# Patient Record
Sex: MALE | Race: WHITE | NOT HISPANIC OR LATINO | URBAN - METROPOLITAN AREA
[De-identification: names, ages, dates, MRNs, and addresses within clinical notes are randomized per-mention and may not be internally consistent; named-entity substitution may affect disease eponyms.]

---

## 2017-01-21 ENCOUNTER — OFFICE VISIT - RIVER FALLS (OUTPATIENT)
Dept: FAMILY MEDICINE | Facility: CLINIC | Age: 52
End: 2017-01-21

## 2017-01-21 ASSESSMENT — MIFFLIN-ST. JEOR: SCORE: 1499.19

## 2017-01-28 ENCOUNTER — OFFICE VISIT - RIVER FALLS (OUTPATIENT)
Dept: FAMILY MEDICINE | Facility: CLINIC | Age: 52
End: 2017-01-28

## 2017-01-28 ASSESSMENT — MIFFLIN-ST. JEOR: SCORE: 1497.37

## 2017-06-11 ENCOUNTER — OFFICE VISIT - RIVER FALLS (OUTPATIENT)
Dept: FAMILY MEDICINE | Facility: CLINIC | Age: 52
End: 2017-06-11

## 2017-06-11 ASSESSMENT — MIFFLIN-ST. JEOR: SCORE: 1485.58

## 2017-09-06 ENCOUNTER — HOSPITAL ENCOUNTER (OUTPATIENT)
Facility: CLINIC | Age: 52
Discharge: HOME | End: 2017-09-06
Attending: OPHTHALMOLOGY | Admitting: OPHTHALMOLOGY
Payer: COMMERCIAL

## 2017-09-06 ENCOUNTER — ANESTHESIA (OUTPATIENT)
Dept: SURGERY | Facility: CLINIC | Age: 52
End: 2017-09-06
Payer: COMMERCIAL

## 2017-09-06 ENCOUNTER — ANESTHESIA EVENT (OUTPATIENT)
Dept: SURGERY | Facility: CLINIC | Age: 52
End: 2017-09-06
Payer: COMMERCIAL

## 2017-09-06 VITALS
HEIGHT: 67 IN | TEMPERATURE: 98 F | OXYGEN SATURATION: 94 % | WEIGHT: 140 LBS | SYSTOLIC BLOOD PRESSURE: 127 MMHG | RESPIRATION RATE: 14 BRPM | DIASTOLIC BLOOD PRESSURE: 76 MMHG | HEART RATE: 56 BPM | BODY MASS INDEX: 21.97 KG/M2

## 2017-09-06 PROCEDURE — S0020 INJECTION, BUPIVICAINE HYDRO: HCPCS | Performed by: OPHTHALMOLOGY

## 2017-09-06 PROCEDURE — 25000125 ZZHC RX 250: Performed by: OPHTHALMOLOGY

## 2017-09-06 PROCEDURE — 71000028 ZZH EYE RECOVERY PHASE 2 EACH 15 MINS: Performed by: OPHTHALMOLOGY

## 2017-09-06 PROCEDURE — 27211045 ZZH EYE GAS ISPAN C3F8: Performed by: OPHTHALMOLOGY

## 2017-09-06 PROCEDURE — 36000104 ZZH EYE SURGERY LEVEL 4 1ST 30 MIN: Performed by: OPHTHALMOLOGY

## 2017-09-06 PROCEDURE — 37000009 ZZH ANESTHESIA TECHNICAL FEE, EACH ADDTL 15 MIN: Performed by: OPHTHALMOLOGY

## 2017-09-06 PROCEDURE — 36000105 ZZH EYE SURGERY LEVEL 4 EA 15 ADDTL MIN: Performed by: OPHTHALMOLOGY

## 2017-09-06 PROCEDURE — 27210794 ZZH OR GENERAL SUPPLY STERILE: Performed by: OPHTHALMOLOGY

## 2017-09-06 PROCEDURE — 25000125 ZZHC RX 250: Performed by: NURSE ANESTHETIST, CERTIFIED REGISTERED

## 2017-09-06 PROCEDURE — 40000170 ZZH STATISTIC PRE-PROCEDURE ASSESSMENT II: Performed by: OPHTHALMOLOGY

## 2017-09-06 PROCEDURE — 25000128 H RX IP 250 OP 636: Performed by: ANESTHESIOLOGY

## 2017-09-06 PROCEDURE — 27210995 ZZH RX 272: Performed by: OPHTHALMOLOGY

## 2017-09-06 PROCEDURE — 25000128 H RX IP 250 OP 636: Performed by: OPHTHALMOLOGY

## 2017-09-06 PROCEDURE — 25000128 H RX IP 250 OP 636: Performed by: NURSE ANESTHETIST, CERTIFIED REGISTERED

## 2017-09-06 PROCEDURE — 25000125 ZZHC RX 250: Performed by: ANESTHESIOLOGY

## 2017-09-06 PROCEDURE — C1784 OCULAR DEV, INTRAOP, DET RET: HCPCS | Performed by: OPHTHALMOLOGY

## 2017-09-06 PROCEDURE — 37000008 ZZH ANESTHESIA TECHNICAL FEE, 1ST 30 MIN: Performed by: OPHTHALMOLOGY

## 2017-09-06 DEVICE — EYE IMP STRIP 4MM STYLE 41 S2970: Type: IMPLANTABLE DEVICE | Site: EYE | Status: FUNCTIONAL

## 2017-09-06 DEVICE — EYE IMP SLEEVE STYLE 70 S3018: Type: IMPLANTABLE DEVICE | Site: EYE | Status: FUNCTIONAL

## 2017-09-06 RX ORDER — DEXAMETHASONE SODIUM PHOSPHATE 10 MG/ML
INJECTION, SOLUTION INTRAMUSCULAR; INTRAVENOUS PRN
Status: DISCONTINUED | OUTPATIENT
Start: 2017-09-06 | End: 2017-09-06 | Stop reason: HOSPADM

## 2017-09-06 RX ORDER — HYDRALAZINE HYDROCHLORIDE 20 MG/ML
2.5-5 INJECTION INTRAMUSCULAR; INTRAVENOUS EVERY 10 MIN PRN
Status: DISCONTINUED | OUTPATIENT
Start: 2017-09-06 | End: 2017-09-06 | Stop reason: HOSPADM

## 2017-09-06 RX ORDER — ATROPINE SULFATE 10 MG/ML
SOLUTION/ DROPS OPHTHALMIC
Status: DISCONTINUED | OUTPATIENT
Start: 1840-12-31 | End: 2017-09-06 | Stop reason: HOSPADM

## 2017-09-06 RX ORDER — CYCLOPENTOLATE HYDROCHLORIDE 10 MG/ML
1 SOLUTION/ DROPS OPHTHALMIC
Status: COMPLETED | OUTPATIENT
Start: 2017-09-06 | End: 2017-09-06

## 2017-09-06 RX ORDER — ONDANSETRON 4 MG/1
4 TABLET, ORALLY DISINTEGRATING ORAL EVERY 30 MIN PRN
Status: DISCONTINUED | OUTPATIENT
Start: 2017-09-06 | End: 2017-09-06 | Stop reason: HOSPADM

## 2017-09-06 RX ORDER — SODIUM CHLORIDE, SODIUM LACTATE, POTASSIUM CHLORIDE, CALCIUM CHLORIDE 600; 310; 30; 20 MG/100ML; MG/100ML; MG/100ML; MG/100ML
INJECTION, SOLUTION INTRAVENOUS CONTINUOUS
Status: DISCONTINUED | OUTPATIENT
Start: 2017-09-06 | End: 2017-09-06 | Stop reason: HOSPADM

## 2017-09-06 RX ORDER — NALOXONE HYDROCHLORIDE 0.4 MG/ML
.1-.4 INJECTION, SOLUTION INTRAMUSCULAR; INTRAVENOUS; SUBCUTANEOUS
Status: DISCONTINUED | OUTPATIENT
Start: 2017-09-06 | End: 2017-09-06 | Stop reason: HOSPADM

## 2017-09-06 RX ORDER — ALBUTEROL SULFATE 0.83 MG/ML
2.5 SOLUTION RESPIRATORY (INHALATION) EVERY 4 HOURS PRN
Status: DISCONTINUED | OUTPATIENT
Start: 2017-09-06 | End: 2017-09-06 | Stop reason: HOSPADM

## 2017-09-06 RX ORDER — FENTANYL CITRATE 50 UG/ML
25-50 INJECTION, SOLUTION INTRAMUSCULAR; INTRAVENOUS
Status: DISCONTINUED | OUTPATIENT
Start: 2017-09-06 | End: 2017-09-06 | Stop reason: HOSPADM

## 2017-09-06 RX ORDER — ONDANSETRON 2 MG/ML
INJECTION INTRAMUSCULAR; INTRAVENOUS PRN
Status: DISCONTINUED | OUTPATIENT
Start: 2017-09-06 | End: 2017-09-06

## 2017-09-06 RX ORDER — ATROPINE SULFATE 10 MG/ML
SOLUTION/ DROPS OPHTHALMIC PRN
Status: DISCONTINUED | OUTPATIENT
Start: 2017-09-06 | End: 2017-09-06 | Stop reason: HOSPADM

## 2017-09-06 RX ORDER — NEOMYCIN SULFATE, POLYMYXIN B SULFATE, AND DEXAMETHASONE 3.5; 10000; 1 MG/G; [USP'U]/G; MG/G
OINTMENT OPHTHALMIC PRN
Status: DISCONTINUED | OUTPATIENT
Start: 2017-09-06 | End: 2017-09-06 | Stop reason: HOSPADM

## 2017-09-06 RX ORDER — BALANCED SALT SOLUTION 6.4; .75; .48; .3; 3.9; 1.7 MG/ML; MG/ML; MG/ML; MG/ML; MG/ML; MG/ML
SOLUTION OPHTHALMIC PRN
Status: DISCONTINUED | OUTPATIENT
Start: 2017-09-06 | End: 2017-09-06 | Stop reason: HOSPADM

## 2017-09-06 RX ORDER — PHENYLEPHRINE HYDROCHLORIDE 25 MG/ML
1 SOLUTION/ DROPS OPHTHALMIC
Status: COMPLETED | OUTPATIENT
Start: 2017-09-06 | End: 2017-09-06

## 2017-09-06 RX ORDER — PROPOFOL 10 MG/ML
INJECTION, EMULSION INTRAVENOUS PRN
Status: DISCONTINUED | OUTPATIENT
Start: 2017-09-06 | End: 2017-09-06

## 2017-09-06 RX ORDER — ONDANSETRON 2 MG/ML
4 INJECTION INTRAMUSCULAR; INTRAVENOUS EVERY 30 MIN PRN
Status: DISCONTINUED | OUTPATIENT
Start: 2017-09-06 | End: 2017-09-06 | Stop reason: HOSPADM

## 2017-09-06 RX ORDER — LIDOCAINE HYDROCHLORIDE 20 MG/ML
INJECTION, SOLUTION INFILTRATION; PERINEURAL PRN
Status: DISCONTINUED | OUTPATIENT
Start: 2017-09-06 | End: 2017-09-06

## 2017-09-06 RX ORDER — MEPERIDINE HYDROCHLORIDE 25 MG/ML
12.5 INJECTION INTRAMUSCULAR; INTRAVENOUS; SUBCUTANEOUS
Status: DISCONTINUED | OUTPATIENT
Start: 2017-09-06 | End: 2017-09-06 | Stop reason: HOSPADM

## 2017-09-06 RX ORDER — FENTANYL CITRATE 50 UG/ML
INJECTION, SOLUTION INTRAMUSCULAR; INTRAVENOUS PRN
Status: DISCONTINUED | OUTPATIENT
Start: 2017-09-06 | End: 2017-09-06

## 2017-09-06 RX ADMIN — CYCLOPENTOLATE HYDROCHLORIDE 1 DROP: 10 SOLUTION/ DROPS OPHTHALMIC at 08:24

## 2017-09-06 RX ADMIN — PHENYLEPHRINE HYDROCHLORIDE 1 DROP: 2.5 SOLUTION/ DROPS OPHTHALMIC at 08:24

## 2017-09-06 RX ADMIN — FENTANYL CITRATE 50 MCG: 50 INJECTION, SOLUTION INTRAMUSCULAR; INTRAVENOUS at 09:15

## 2017-09-06 RX ADMIN — MIDAZOLAM HYDROCHLORIDE 2 MG: 1 INJECTION, SOLUTION INTRAMUSCULAR; INTRAVENOUS at 09:15

## 2017-09-06 RX ADMIN — ONDANSETRON 4 MG: 2 INJECTION INTRAMUSCULAR; INTRAVENOUS at 09:30

## 2017-09-06 RX ADMIN — SODIUM CHLORIDE, POTASSIUM CHLORIDE, SODIUM LACTATE AND CALCIUM CHLORIDE: 600; 310; 30; 20 INJECTION, SOLUTION INTRAVENOUS at 08:25

## 2017-09-06 RX ADMIN — PHENYLEPHRINE HYDROCHLORIDE 1 DROP: 2.5 SOLUTION/ DROPS OPHTHALMIC at 08:18

## 2017-09-06 RX ADMIN — PROPOFOL 40 MG: 10 INJECTION, EMULSION INTRAVENOUS at 09:16

## 2017-09-06 RX ADMIN — PHENYLEPHRINE HYDROCHLORIDE 1 DROP: 2.5 SOLUTION/ DROPS OPHTHALMIC at 08:13

## 2017-09-06 RX ADMIN — PROPOFOL 40 MG: 10 INJECTION, EMULSION INTRAVENOUS at 09:15

## 2017-09-06 RX ADMIN — CYCLOPENTOLATE HYDROCHLORIDE 1 DROP: 10 SOLUTION/ DROPS OPHTHALMIC at 08:13

## 2017-09-06 RX ADMIN — LIDOCAINE HYDROCHLORIDE 40 MG: 20 INJECTION, SOLUTION INFILTRATION; PERINEURAL at 09:15

## 2017-09-06 RX ADMIN — LIDOCAINE HYDROCHLORIDE 1 ML: 10 INJECTION, SOLUTION EPIDURAL; INFILTRATION; INTRACAUDAL; PERINEURAL at 08:25

## 2017-09-06 RX ADMIN — CYCLOPENTOLATE HYDROCHLORIDE 1 DROP: 10 SOLUTION/ DROPS OPHTHALMIC at 08:18

## 2017-09-06 NOTE — DISCHARGE INSTRUCTIONS
[facility] Discharge  Instructions  Anesthesia (Eye Care Center)   Adult Discharge Instructions    For 24 hours after surgery    1. Get plenty of rest.  Make arrangements to have a responsible adult stay with you for at least 6 hours after you leave the hospital.  2. Do not drive or use heavy equipment for 24 hours.    3. Do not drink alcohol for 24 hours.  4. Do not sign legal documents or make important decisions for 24 hours.  5. Avoid strenuous or risky activities. You may feel lightheaded.  If so, sit for a few minutes before standing.  Have someone help you get up.   6. Conscious sedation patients may resume a regular diet..  7. Any questions of medical nature, call your physician.     INSTRUCTIONS FOLLOWING SURGERY  DR. PERRY, DR. KENDALL, DR. MCFADDEN, DR. PIRES, DR. SERRA    Will I have pain?  Some discomfort is normal and expected following surgery.  The first few days after surgery you may need to use prescription pain pills.  Taking Advil (Ibuprofen) regularly may help prevent pain.    Discomfort should gradually decrease and Tylenol or Advil should be sufficient to relieve pain. A foreign body sensation in the cornea of the eye is very common and caused by sutures placed at surgery. These sutures will go away in one to two weeks. If the pain worsens, you should call the doctor.     Do I need to wear an eye patch?  You do not need to wear an eye patch at home after the doctor has removed the patch on your first day after surgery.  However, you may be more comfortable wearing a patch outside in the sun, when sleeping or napping, or in a angelina, windy environment.     How much drainage should I have?  You may expect a moderate amount of drainage for a week.  Gradually the drainage should decrease.  The lids can be cleaned with a clean washcloth and gentle soap or diluted baby shampoo.  Wipe the eyelids gently from the nose outward. Some blood in the tears is a normal  finding.    Will there be swelling?  Some swelling is normal for about a week or two after which it will gradually decrease.  Applying a cool compress, using a clean washcloth, for 5 - 10 minutes several times a day may reduce the swelling and make you more comfortable.  People may have some swelling of both eyes, especially if face down positioning is required. The white part of the eye may appear very red or bloody for a week or two. This may get worse a few days after surgery. Though the bright red appearance can look frightening, it is a normal finding early after surgery and will resolve in a few weeks.    Will I need to use eye drops?  You will be using several different kinds of eye drops or ointment (salve) when you leave the hospital.  The directions will be on each bottle. The medication with the red top will keep your eye dilated and may make your eye more sensitive to light. Wearing sunglasses may help. The other medication is a combination antibiotics/steroid to prevent infection and promote healing.  Occasionally a third drop is used to control the pressure in your eye. A new bottle of artificial tears or lubricant ointment may be used along with your prescription eye drops after surgery. You will be using drops from four to eight weeks. Bring all eye medications (drops, ointments, or pills) with you to each visit.    Always wash your hands before putting in the eye drops.  You may wish to have someone else help you.  Pull down on the lower lid and squeeze one drop from the bottle being careful not to touch the dropper to your eye or eyelid.  One drop is sufficient, but another may be used if the first did not go into the eye.  It is often easier to put in the drops if you are reclining or lying down.  Wait five (5) minutes after the first drop before using the second drop to allow the medications to absorb into the eye.        How long will it take for my vision to improve?  Your vision should  gradually improve, but it may take up to six months to regain your best vision.  Frequently, air or gas bubbles are injected into the eye at the time of surgery.  This will blur your vision significantly at first.  As the bubble becomes smaller it will cause a black line in your vision that moves as you move your head.  As the bubble becomes smaller you may notice that it looks more like a bubble or that it will break up into several smaller bubbles. It will take from a few days to a few weeks for the bubble to dissolve and be replaced by clear fluid.     You may notice floaters or double vision after your surgery.  These symptoms usually will decrease with time.  If the double vision is bothersome patching the eye may help.    If you notice a sudden worsening in your vision call your doctor.    Are there any physical restrictions after surgery?  If an air or gas bubble was placed in the eye during surgery you will be asked to spend most of your time (both awake and during the night) with your head in a specific position, frequently face down.  You can sleep face down or on your left side (down) until tomorrow morning. As the eye heals and the bubble dissolves there will be less of a need for you to stay in that specific position.  You should avoid sleeping on your back until the bubble has totally dissolved and you have been given permission from your surgeon. You should not fly in an airplane or go in high altitudes in the mountains while there is a bubble in your eye. If you should require any other surgery, under general anesthesia, while you still have an air bubble, have your surgeon or anesthetist contact us prior to your surgery. Some anesthetic agents can make the bubble expand and seriously damage your eye.  You will have a green medical alert band placed on your wrist for this reason; this should not be removed until the doctor gives you permission or removes it for you.    Heavy lifting (greater than 50  pounds), swimming and contact sports should be avoided for about 3 to 4 weeks after surgery.    You may resume your usual sexual activities about one week after surgery.    When may I return to work or my normal activities?  Depending on the type of work, you may return to work within a few days.  If your work involves physical activity or driving, you will need to restrict your activities and remain home longer.    You may watch TV, look at magazines, or work puzzles.  Reading may be uncomfortable for several days, but using the eyes will not cause any damage.    You may go outside as usual.  If conditions are windy or angelina wear an eye pad to avoid getting dust or dirt in the eye.    Can I travel?  You cannot fly in an airplane or drive into the mountains as long as the air or gas bubble remains in your eye.    Are there any driving restrictions?  Someone will need to drive you home from the hospital.  Generally driving can be resumed in several days if you have good vision in your other eye.  If you do not feel comfortable driving, do not drive!  Your depth perception will be decreased so you will want to try driving during the day in light traffic until you feel comfortable driving.  You should restrict your driving while you are taking prescription pain pills as they also can affect your judgment.    When can I shower and wash my hair?  You may shower or bathe when you get home, but avoid getting water in your eye.  You may want someone to help you shampoo your hair at first.      You may shave, brush your teeth, or comb your hair.  Do not use make-up, mascara, or creams/lotions around your eyes for several weeks     When will I see the doctor again?  Generally you will be seen the first day after surgery and again 1-2 weeks later.  If you have not received a return appointment before leaving the hospital, you should call our office during the business hours to arrange an appointment. If you will be seeing your  local doctor instead of us, you will need to call that office to set up an appointment.    How do I reach a doctor if I have concerns?  One of our doctors is available by calling [phone] in the [place] area, [phone] in [place], or [phone] from outside the area.  After normal office hours the answering service will put you in touch with the doctor on call. The doctors take call from home but are available for a retinal emergency. Please try to call for routine questions and prescription refills during business hours.    You should call your doctor if:      You notice a sudden decrease in your vision.      Have severe pain or pain increases rather than subsiding.      You notice a new black curtain over your eye that is not the gas bubble.    If you have any of these symptoms, you may need to be examined.

## 2017-09-06 NOTE — IP AVS SNAPSHOT
MRN:[id number]                      After Visit Summary   9/6/2017    Jevon Lebron    MRN: [id number]           Thank you!     Thank you for choosing [facility] for your care. Our goal is always to provide you with excellent care. Hearing back from our patients is one way we can continue to improve our services. Please take a few minutes to complete the written survey that you may receive in the mail after you visit with us. Thank you!        Patient Information     Date Of Birth          1965        About your hospital stay     You were admitted on:  September 6, 2017 You last received care in the:  [facility]    You were discharged on:  September 6, 2017       Who to Call     For medical emergencies, please call 911.  For non-urgent questions about your medical care, please call your primary care provider or clinic, None  For questions related to your surgery, please call your surgery clinic        Attending Provider     Provider Monster Arenas MD Surgery       Primary Care Provider    None Specified      After Care Instructions     Activity       Avoid strenuous activities the next several days.            Position       Face down at all times.                  Further instructions from your care team       [facility] Discharge  Instructions  Anesthesia (Eye Care Center)   Adult Discharge Instructions    For 24 hours after surgery    1. Get plenty of rest.  Make arrangements to have a responsible adult stay with you for at least 6 hours after you leave the hospital.  2. Do not drive or use heavy equipment for 24 hours.    3. Do not drink alcohol for 24 hours.  4. Do not sign legal documents or make important decisions for 24 hours.  5. Avoid strenuous or risky activities. You may feel lightheaded.  If so, sit for a few minutes before standing.  Have someone help you get up.   6. Conscious sedation patients may  resume a regular diet..  7. Any questions of medical nature, call your physician.     INSTRUCTIONS FOLLOWING SURGERY  DR. PERRY, DR. KENDALL, DR. MCFADDEN, DR. PIRES, DR. SERRA    Will I have pain?  Some discomfort is normal and expected following surgery.  The first few days after surgery you may need to use prescription pain pills.  Taking Advil (Ibuprofen) regularly may help prevent pain.    Discomfort should gradually decrease and Tylenol or Advil should be sufficient to relieve pain. A foreign body sensation in the cornea of the eye is very common and caused by sutures placed at surgery. These sutures will go away in one to two weeks. If the pain worsens, you should call the doctor.     Do I need to wear an eye patch?  You do not need to wear an eye patch at home after the doctor has removed the patch on your first day after surgery.  However, you may be more comfortable wearing a patch outside in the sun, when sleeping or napping, or in a angelina, windy environment.     How much drainage should I have?  You may expect a moderate amount of drainage for a week.  Gradually the drainage should decrease.  The lids can be cleaned with a clean washcloth and gentle soap or diluted baby shampoo.  Wipe the eyelids gently from the nose outward. Some blood in the tears is a normal finding.    Will there be swelling?  Some swelling is normal for about a week or two after which it will gradually decrease.  Applying a cool compress, using a clean washcloth, for 5 - 10 minutes several times a day may reduce the swelling and make you more comfortable.  People may have some swelling of both eyes, especially if face down positioning is required. The white part of the eye may appear very red or bloody for a week or two. This may get worse a few days after surgery. Though the bright red appearance can look frightening, it is a normal finding early after surgery and will resolve in a few weeks.    Will I need to use eye drops?  You  will be using several different kinds of eye drops or ointment (salve) when you leave the hospital.  The directions will be on each bottle. The medication with the red top will keep your eye dilated and may make your eye more sensitive to light. Wearing sunglasses may help. The other medication is a combination antibiotics/steroid to prevent infection and promote healing.  Occasionally a third drop is used to control the pressure in your eye. A new bottle of artificial tears or lubricant ointment may be used along with your prescription eye drops after surgery. You will be using drops from four to eight weeks. Bring all eye medications (drops, ointments, or pills) with you to each visit.    Always wash your hands before putting in the eye drops.  You may wish to have someone else help you.  Pull down on the lower lid and squeeze one drop from the bottle being careful not to touch the dropper to your eye or eyelid.  One drop is sufficient, but another may be used if the first did not go into the eye.  It is often easier to put in the drops if you are reclining or lying down.  Wait five (5) minutes after the first drop before using the second drop to allow the medications to absorb into the eye.        How long will it take for my vision to improve?  Your vision should gradually improve, but it may take up to six months to regain your best vision.  Frequently, air or gas bubbles are injected into the eye at the time of surgery.  This will blur your vision significantly at first.  As the bubble becomes smaller it will cause a black line in your vision that moves as you move your head.  As the bubble becomes smaller you may notice that it looks more like a bubble or that it will break up into several smaller bubbles. It will take from a few days to a few weeks for the bubble to dissolve and be replaced by clear fluid.     You may notice floaters or double vision after your surgery.  These symptoms usually will decrease  with time.  If the double vision is bothersome patching the eye may help.    If you notice a sudden worsening in your vision call your doctor.    Are there any physical restrictions after surgery?  If an air or gas bubble was placed in the eye during surgery you will be asked to spend most of your time (both awake and during the night) with your head in a specific position, frequently face down.  You can sleep face down or on your left side (down) until tomorrow morning. As the eye heals and the bubble dissolves there will be less of a need for you to stay in that specific position.  You should avoid sleeping on your back until the bubble has totally dissolved and you have been given permission from your surgeon. You should not fly in an airplane or go in high altitudes in the mountains while there is a bubble in your eye. If you should require any other surgery, under general anesthesia, while you still have an air bubble, have your surgeon or anesthetist contact us prior to your surgery. Some anesthetic agents can make the bubble expand and seriously damage your eye.  You will have a green medical alert band placed on your wrist for this reason; this should not be removed until the doctor gives you permission or removes it for you.    Heavy lifting (greater than 50 pounds), swimming and contact sports should be avoided for about 3 to 4 weeks after surgery.    You may resume your usual sexual activities about one week after surgery.    When may I return to work or my normal activities?  Depending on the type of work, you may return to work within a few days.  If your work involves physical activity or driving, you will need to restrict your activities and remain home longer.    You may watch TV, look at magazines, or work puzzles.  Reading may be uncomfortable for several days, but using the eyes will not cause any damage.    You may go outside as usual.  If conditions are windy or angelina wear an eye pad to avoid  getting dust or dirt in the eye.    Can I travel?  You cannot fly in an airplane or drive into the mountains as long as the air or gas bubble remains in your eye.    Are there any driving restrictions?  Someone will need to drive you home from the hospital.  Generally driving can be resumed in several days if you have good vision in your other eye.  If you do not feel comfortable driving, do not drive!  Your depth perception will be decreased so you will want to try driving during the day in light traffic until you feel comfortable driving.  You should restrict your driving while you are taking prescription pain pills as they also can affect your judgment.    When can I shower and wash my hair?  You may shower or bathe when you get home, but avoid getting water in your eye.  You may want someone to help you shampoo your hair at first.      You may shave, brush your teeth, or comb your hair.  Do not use make-up, mascara, or creams/lotions around your eyes for several weeks     When will I see the doctor again?  Generally you will be seen the first day after surgery and again 1-2 weeks later.  If you have not received a return appointment before leaving the hospital, you should call our office during the business hours to arrange an appointment. If you will be seeing your local doctor instead of us, you will need to call that office to set up an appointment.    How do I reach a doctor if I have concerns?  One of our doctors is available by calling [phone] in the [facility], [phone] in [place], or [phone] from outside the area.  After normal office hours the answering service will put you in touch with the doctor on call. The doctors take call from home but are available for a retinal emergency. Please try to call for routine questions and prescription refills during business hours.    You should call your doctor if:      You notice a sudden decrease in your vision.      Have severe pain or  "pain increases rather than subsiding.      You notice a new black curtain over your eye that is not the gas bubble.    If you have any of these symptoms, you may need to be examined.    Pending Results     No orders found from 2017 to 2017.            Admission Information     Date & Time Provider Department Dept. Phone    2017 Monster Salas MD [facility] [phone]      Your Vitals Were     Blood Pressure Pulse Temperature Respirations Height Weight    127/76 56 98  F (36.7  C) (Temporal) 14 1.702 m (5' 7\") 63.5 kg (140 lb)    Pulse Oximetry BMI (Body Mass Index)                94% 21.93 kg/m2          MyChart Information     [facility] lets you send messages to your doctor, view your test results, renew your prescriptions, schedule appointments and more. To sign up, go to [email or web address] . Click on \"Log in\" on the left side of the screen, which will take you to the Welcome page. Then click on \"Sign up Now\" on the right side of the page.     You will be asked to enter the access code listed below, as well as some personal information. Please follow the directions to create your username and password.     Your access code is: [email or web address]  Expires: 2017 10:30 AM     Your access code will  in 90 days. If you need help or a new code, please call your [facility] clinic or [phone].        Care EveryWhere ID     This is your Care EveryWhere ID. This could be used by other organizations to access your [facility] medical records  [id number]        Equal Access to Services     [facility] Center[facility] AH: Hadjason Moraes, walacho umanzor, qanasrin leon. So [facility] [phone].    ATENCIÓN: Si habla español, tiene a lyles disposición servicios gratuitos de asistencia lingüística. Llame al [phone].    We comply with applicable federal civil rights laws and [place] laws. We do not discriminate on the basis of " race, color, national origin, age, disability sex, sexual orientation or gender identity.               Review of your medicines      CONTINUE these medicines which have NOT CHANGED        Dose / Directions    ATIVAN PO        Refills:  0                Protect others around you: Learn how to safely use, store and throw away your medicines at www.disposemymeds.org.             Medication List: This is a list of all your medications and when to take them. Check marks below indicate your daily home schedule. Keep this list as a reference.      Medications           Morning Afternoon Evening Bedtime As Needed    ATIVAN PO

## 2017-09-06 NOTE — OP NOTE
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      PROCEDURE:  Retinal detachment repair with scleral buckle, 23-gauge pars plana vitrectomy, air-fluid exchange, endolaser, 10% C3F8 gas exchange, right eye.      SURGEON:  Monster Salas MD      ASSISTANT:  Nitin Otoole MD      ANESTHESIA:  Local with monitored anesthesia care.      ESTIMATED BLOOD LOSS:  Minimum.      SPECIMENS:  None.      COMPLICATIONS:  None.      INDICATIONS:  Jevon Lebron presented with decreased vision in the right eye due to a macula-on retinal detachment.  Surgical intervention was offered and the patient decided to proceed after the risks, benefits and alternatives were explained.  Signed and witnessed informed consent was obtained.      DESCRIPTION OF PROCEDURE:  The patient was given a retrobulbar block in the preoperative holding area.  The patient was taken to the operating room and placed in the supine position.  The right eye was prepped and draped in the usual sterile fashion for ophthalmic surgery and a lid speculum was placed.  The conjunctiva was opened for 360 degrees with blunt Rikki scissors.  The 4 quadrants were cleared with Hooker tenotomy scissors.  The 4 rectus muscles were looped with 2-0 silk ties.  64 and 66 blades were used to make belt loops in the superonasal, inferonasal and inferotemporal quadrants.  A 5-0 nylon suture was preplaced in the superotemporal quadrant since the sclera was fairly thin there.  A 41 silicone band was placed around the circumference of the globe.  The band was secured to itself in the inferonasal quadrant with a 70 Watzke sleeve.  The band was pulled up to achieve a moderate buckling effect.  The operating microscope was positioned.  The eye was opened for standard 23 gauge pars plana vitrectomy.  The eye was entered with a  light pipe and vitrectomy probe, and the BIOM was positioned.  The patient had an inferior macula-on retinal detachment with subretinal fluid extending from 4:30 clockwise to 8 o'clock.  There was a large retinal tear at 7 o'clock and 2 smaller retinal holes at 5 o'clock and 5:30.  There was a mild vitreous hemorrhage.  A thorough vitrectomy was performed and the vitreous was trimmed into the periphery using scleral depression.  The retinal tears were marked with diathermy.  An air-fluid exchange was performed and the subretinal fluid was drained through the primary tears.  The retina flattened well.  Endolaser was placed around all peripheral pathology.  The vitreous cavity was filled with 10% C3F8 gas.  All sclerotomies were closed with 8-0 Vicryl.  The eye was left at physiologic pressure and the vitreous cavity had a gas fill of 10% C3F8.  The conjunctiva was closed with 6-0 plain.  Subconjunctival injections of Ancef and Dexamethasone were placed.  Maxitrol and atropine were applied.  A sterile eye pad and shield were taped into position.  The patient was taken to the recovery area in stable condition after tolerating surgery well.  He will position face down and will follow up in 1 day.      The help of a trained vitreoretinal assistant was necessary in the case in order to improve peripheral visualization and to contribute to achieving the surgical objectives.     DELFIN KENDALL MD             D: 2017 10:16   T: 2017 16:03   MT: [id number]#[id number]      Name:     DELICIA VEE   MRN:      [id number]        Account:        [id number]   :      1965           Procedure Date: 2017      Document: [id number]

## 2017-09-06 NOTE — ANESTHESIA CARE TRANSFER NOTE
Patient: Jevon Lebron    Procedure(s):  RIGHT EYE VITRECTOMY PARSPLANA, SCLERAL BUCKLE/RETINAL REATTACHMENT WITH 23 GAUGE SYSTEM, ENDOLASER, AIR/FLUID EXCHANGE, INFUSION OF 10% C3F8 GAS - Wound Class: I-Clean    Diagnosis: RETINAL DETACHMENT  Diagnosis Additional Information: No value filed.    Anesthesia Type:   MAC     Note:  Airway :Room Air  Patient transferred to:Phase II  Comments: Pt awake and able to verbalize needs. Spontaneous respiration without difficulty.  All monitors on and audible. Report given to PACU RN, Vital Signs Stable. Pt denies pain.      Vitals: (Last set prior to Anesthesia Care Transfer)    CRNA VITALS  9/6/2017 0943 - 9/6/2017 1018      9/6/2017             Resp Rate (set): 10                Electronically Signed By: MAKSIM Hartmann CRNA  September 6, 2017  10:18 AM

## 2017-09-06 NOTE — ANESTHESIA POSTPROCEDURE EVALUATION
Patient: eJvon Lebron    Procedure(s):  RIGHT EYE VITRECTOMY PARSPLANA, SCLERAL BUCKLE/RETINAL REATTACHMENT WITH 23 GAUGE SYSTEM, ENDOLASER, AIR/FLUID EXCHANGE, INFUSION OF 10% C3F8 GAS - Wound Class: I-Clean    Diagnosis:RETINAL DETACHMENT  Diagnosis Additional Information: No value filed.    Anesthesia Type:  MAC    Note:  Anesthesia Post Evaluation    Patient location during evaluation: PACU  Patient participation: Able to fully participate in evaluation  Level of consciousness: awake  Pain management: adequate  Airway patency: patent  Cardiovascular status: acceptable  Respiratory status: acceptable  Hydration status: acceptable  PONV: none     Anesthetic complications: None          Last vitals:  Vitals:    09/06/17 0818 09/06/17 1016 09/06/17 1025   BP: 144/81 117/75 127/76   Pulse: 56     Resp: 16 14 14   Temp: 36.7  C (98  F)     SpO2: 98% 94% 94%         Electronically Signed By: Nataliia Owen MD, MD  September 6, 2017  2:41 PM

## 2017-09-06 NOTE — BRIEF OP NOTE
[facility] Brief Operative Note    Pre-operative diagnosis: RETINAL DETACHMENT, right eye   Post-operative diagnosis RETINAL DETACHMENT, right eye   Procedure: Procedure(s):  RIGHT EYE VITRECTOMY PARSPLANA, SCLERAL BUCKLE/RETINAL REATTACHMENT WITH 23 GAUGE SYSTEM, ENDOLASER, AIR/FLUID EXCHANGE, INFUSION OF 10% C3F8 GAS - Wound Class: I-Clean   Surgeon(s): Surgeon(s) and Role:     * Monster Salas MD - Primary     * Nitin Otoole MD - Assisting   Estimated blood loss: * No values recorded between 9/6/2017  9:26 AM and 9/6/2017 10:15 AM *    Specimens: * No specimens in log *   Findings: As above

## 2017-09-06 NOTE — IP AVS SNAPSHOT
[facility]    [address]    [place] [address]    Phone:  [phone]    Fax:  [phone]                                       After Visit Summary   9/6/2017    Jevon Lebron    MRN: [id number]           After Visit Summary Signature Page     I have received my discharge instructions, and my questions have been answered. I have discussed any challenges I see with this plan with the nurse or doctor.    ..........................................................................................................................................  Patient/Patient Representative Signature      ..........................................................................................................................................  Patient Representative Print Name and Relationship to Patient    ..................................................               ................................................  Date                                            Time    ..........................................................................................................................................  Reviewed by Signature/Title    ...................................................              ..............................................  Date                                                            Time

## 2017-09-06 NOTE — ANESTHESIA PREPROCEDURE EVALUATION
Anesthesia Evaluation     . Pt has had prior anesthetic.     No history of anesthetic complications          ROS/MED HX    ENT/Pulmonary:      (-) sleep apnea   Neurologic:       Cardiovascular:         METS/Exercise Tolerance:     Hematologic:         Musculoskeletal:         GI/Hepatic:        (-) GERD   Renal/Genitourinary:         Endo:         Psychiatric:         Infectious Disease:         Malignancy:         Other:                                    Anesthesia Plan      History & Physical Review  History and physical reviewed and following examination; no interval change.    ASA Status:  1 .    NPO Status:  > 8 hours    Plan for MAC Reason for MAC:  Procedure to face, neck, head or breast  PONV prophylaxis:  Ondansetron (or other 5HT-3)       Postoperative Care  Postoperative pain management:  Oral pain medications and IV analgesics.      Consents  Anesthetic plan, risks, benefits and alternatives discussed with:  Patient..                        Procedure: Procedure(s):  VITRECTOMY PARSPLANA, SCLERAL BUCKLE/RETINAL REATTACHMENT WITH 23 GAUGE SYSTEM  Preop diagnosis: RETINAL DETACHMENT    No Known Allergies  No past medical history on file.  No past surgical history on file.  Prior to Admission medications    Medication Sig Start Date End Date Taking? Authorizing Provider   LORazepam (ATIVAN PO)    Yes Reported, Patient     Current Facility-Administered Medications Ordered in Epic   Medication Dose Route Frequency Last Rate Last Dose     lactated ringers infusion   Intravenous Continuous         cyclopentolate (CYCLOGYL) 1 % ophthalmic solution 1 drop  1 drop Ophthalmic q5 Min Prior to Surgery   1 drop at 09/06/17 0818     phenylephrine (MYDFRIN /HOLLAND-SYNEPHRINE) 2.5 % ophthalmic solution 1 drop  1 drop Ophthalmic q5 Min Prior to Surgery   1 drop at 09/06/17 0818     bupivacaine 0.75% (pf) 4.5mL + lidocaine 2% (pf) 4.5mL + hyaluronidase (HYLENEX) 150 units   Retrobulbar Once         povidone-iodine 5 %  ophthalmic solution 1 drop  1 drop Ophthalmic Once         lidocaine 1 % 1 mL  1 mL Other Q1H PRN         lactated ringers infusion   Intravenous Continuous         No current [facility]-ordered outpatient prescriptions on file.     Wt Readings from Last 1 Encounters:   No data found for Wt     Temp Readings from Last 1 Encounters:   No data found for Temp     BP Readings from Last 6 Encounters:   No data found for BP     Pulse Readings from Last 4 Encounters:   No data found for Pulse     Resp Readings from Last 1 Encounters:   No data found for Resp     SpO2 Readings from Last 1 Encounters:   No data found for SpO2     No results for input(s): NA, POTASSIUM, CHLORIDE, CO2, ANIONGAP, GLC, BUN, CR, RAJ in the last 20327 hours.  No results for input(s): AST, ALT in the last 95940 hours.    Invalid input(s): ALP, BILT, LPSE  No results for input(s): WBC, HGB, PLT in the last 63521 hours.  No results for input(s): INR in the last 55196 hours.    Invalid input(s): APTT   No results for input(s): TROPI in the last 86551 hours.  RECENT LABS:   ECG:   ECHO:   CXR:

## 2017-09-06 NOTE — OR NURSING
Green gas band applied to right wrist. Patient instructed to not removed armband until instructed to do so by physician.     Post-Op Position: Face Down    Dino Mendez RN

## 2022-02-12 VITALS
TEMPERATURE: 97.2 F | DIASTOLIC BLOOD PRESSURE: 78 MMHG | BODY MASS INDEX: 24.86 KG/M2 | WEIGHT: 158 LBS | SYSTOLIC BLOOD PRESSURE: 124 MMHG | WEIGHT: 158.4 LBS | HEART RATE: 74 BPM | HEIGHT: 67 IN | HEIGHT: 67 IN | DIASTOLIC BLOOD PRESSURE: 72 MMHG | TEMPERATURE: 96.7 F | SYSTOLIC BLOOD PRESSURE: 120 MMHG | HEART RATE: 56 BPM | BODY MASS INDEX: 24.8 KG/M2

## 2022-02-12 VITALS
TEMPERATURE: 97.8 F | WEIGHT: 155.4 LBS | SYSTOLIC BLOOD PRESSURE: 126 MMHG | OXYGEN SATURATION: 95 % | HEIGHT: 67 IN | DIASTOLIC BLOOD PRESSURE: 70 MMHG | BODY MASS INDEX: 24.39 KG/M2 | HEART RATE: 62 BPM

## 2022-02-16 NOTE — PROGRESS NOTES
Patient:   DELICIA VEE            MRN: [id number]            FIN: [id number]               Age:   51 years     Sex:  Male     :  1965   Associated Diagnoses:   None   Author:   Mick Mckinnon MD      Visit Information      Date of Service: 2017 10:00 am  Performing Location: [facility]  Encounter#: [id number]      Primary Care Provider (PCP):  [id number] -UNKNOWN,      Referring Provider:  Mick Mckinnon MD    NPI# [id number]      Chief Complaint   2017 10:06 AM CDT   Pt here for cough and fever 100 x 4 days        History of Present Illness   CC as above and reviewed w  patient    Cough is productive but no hemoptysis. Has been around some school age children who were sick.      Review of Systems         Resp denies shortness of breath      Health Status   Allergies:    Allergic Reactions (Selected)  No Known Medication Allergies   Medications:  (Selected)   Prescriptions  Prescribed  Tessalon Perles 100 mg oral capsule: 1 cap(s) ( 100 mg ), po, tid, PRN: as needed for cough, # 15 cap(s), 0 Refill(s), Type: Maintenance, Pharmacy: [id number], 1 cap(s) po tid,x5 day(s),PRN:as needed for cough  Zithromax 250 mg oral tablet: 1 packet(s), PO, Once, Instructions: as directed on package labeling, # 6 tab(s), 0 Refill(s), Type: Soft Stop, Pharmacy: [id number], 1 packet(s) po once,Instr:as directed on package labeling  lorazepam 1 mg oral tablet: 1 tab(s) ( 1 mg ), PO, TID, Instructions: Use very occasinal, PRN: for anxiety, # 30 tab(s), 0 Refill(s), Type: Maintenance   Problem list:    All Problems  Family history of polycythemia vera / SNOMED CT 551071282 / Confirmed  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 - Post-operative ileus: Onset on 2014 at 48 years.  Resolved on 2014 at 48 years.  Anxiety (300.00):  Resolved.   Family History:    Myelodysplastic  syndrome  Mother  Diabetes mellitus  Grandmother (M)     Procedure history:    Appendectomy ([id number]) on 4/20/2014 at 48 Years.   Social History:             No active social history items have been recorded.      Physical Examination   Vital Signs   6/11/2017 10:06 AM CDT Temperature Tympanic 97.8 DegF  LOW    Peripheral Pulse Rate 62 bpm    Pulse Site Radial artery    HR Method Manual    Systolic Blood Pressure 126 mmHg    Diastolic Blood Pressure 70 mmHg    Mean Arterial Pressure 89 mmHg    BP Site Right arm    BP Method Manual    Oxygen Saturation 95 %      Measurements from flowsheet : Measurements   6/11/2017 10:06 AM CDT Height Measured - Standard 66.5 in    Weight Measured - Standard 155.4 lb    BSA 1.82 m2    Body Mass Index 24.7 kg/m2      Appears well  ENT:  Normal oropharynx. Moist mucus membranes   Neck - supple w/o adenopathy  Resp: Clear to auscultation b/l. Normal breath sounds without wheezes or crackles. No increased work of breathing.   CV: RRR w/o MRG. Normal S1 and S2       Impression and Plan   Cough  - likely viral bronchitis; suggested tessalon perles  - he is traveling to [place] on a flight in a few days I sugested he start a course of azithromycin if he's continues to have low grade fevers prior to leaving.

## 2022-02-16 NOTE — PROGRESS NOTES
Patient:   DELICIA VEE            MRN: [id number]            FIN: [id number]               Age:   51 years     Sex:  Male     :  1965   Associated Diagnoses:   Right medial knee pain   Author:   Matt Begum MD      Visit Information      Date of Service: 2017 07:52 am  [facility]  Encounter#: [id number]      Chief Complaint   2017 8:01 AM CST    f/u R knee - much improved        History of Present Illness             The patient presents with a knee problem.  The location of the knee problem is the right knee, medial aspect.  The knee problem is characterized by tenderness.  The severity of the knee problem is mild.  The timing/course of symptom(s) associated with the knee problem is improving.  The knee problem has lasted for 2 week(s).  Radiation of pain: none.  The context of the knee problem: occurred in association with repetitive stress.  Exacerbating factors consist of squatting and twisting.  Associated symptoms consist of none.     He purchased a brace to wear [employer] skiing. He is much improved over the last week.  Was diagnosed with a MCL injury.      Review of Systems   All other systems were reviewed and are negative.      Health Status   Allergies:    Allergic Reactions (Selected)  No Known Medication Allergies   Medications:  (Selected)   Prescriptions  Prescribed  lorazepam 1 mg oral tablet: 1 tab(s) ( 1 mg ), PO, TID, Instructions: Use very occasinal, PRN: for anxiety, # 30 tab(s), 0 Refill(s), Type: Maintenance      Physical Examination   Vital Signs   2017 8:01 AM CST Temperature Tympanic 97.2 DegF  LOW    Peripheral Pulse Rate 56 bpm  LOW    Pulse Site Radial artery    HR Method Manual    Systolic Blood Pressure 124 mmHg    Diastolic Blood Pressure 72 mmHg    Mean Arterial Pressure 89 mmHg    BP Site Right arm    BP Method Manual      Measurements from flowsheet : Measurements   2017 8:01 AM CST Height Measured -  Standard 66.5 in    Weight Measured - Standard 158 lb    BSA 1.83 m2    Body Mass Index 25.12 kg/m2      General:  Alert and oriented, No acute distress.    Respiratory:  Respirations are non-labored.    Cardiovascular:  Normal rate.    Musculoskeletal:       Lower extremity exam: Knee ( Right, Medial, Anterior cruciate ligament, Posterior cruciate ligament, Medial collateral ligament, Lateral collateral ligament, Intact, Tenderness, Alvaro test ( Medial positive ( On the right ) ) ).       Impression and Plan   Diagnosis     Right medial knee pain ([id number] M25.561).     Course:  Improving.    Plan:  knee brace as needed, follow up if not improving. cautious return to activity.

## 2022-02-16 NOTE — PROGRESS NOTES
Patient:   DELICIA VEE            MRN: [id number]            FIN: [id number]               Age:   51 years     Sex:  Male     :  1965   Associated Diagnoses:   Knee MCL sprain   Author:   Randell Javier PA-C      Visit Information   Visit type:  General concerns.    Accompanied by:  No one.    Source of history:  Self.    Referral source:  Self.    History limitation:  None.       Chief Complaint   2017 9:48 AM CST    Right knee pain, fell 2 weeks ago on right knee, fell on it again yesterday,       History of Present Illness             The patient presents with a knee problem.  The location of the knee problem is localized, the right knee, medial aspect.  The knee problem is characterized by aching.  The severity of the knee problem is moderate.  The timing/course of symptom(s) associated with the knee problem is worsening.  The knee problem has lasted for 2 week(s).  The context of the knee problem: occurred in association with sports injury.  Fell x two in past two weeks. Painful medial and posterior right knee. No past injury. Worse last night. Hurts with wedging while on ski [place]. Works as instructor. CC above noted and confirmed with the patient..        Review of Systems   Constitutional:  Negative.    Musculoskeletal:  Negative except as documented in history of present illness.    Integumentary:  Negative.    Neurologic:  Negative.       Health Status   Allergies:    Allergic Reactions (All)  No known allergies   Medications:  (Selected)   Prescriptions  Prescribed  lorazepam 1 mg oral tablet: 1 tab(s) ( 1 mg ), PO, TID, Instructions: Use very occasinal, PRN: for anxiety, # 30 tab(s), 0 Refill(s), Type: Maintenance   Problem list:    All Problems  Family history of polycythemia vera / SNOMED CT 230580376 / Confirmed  Fear of flying / SNOMED CT 755220153 / Confirmed      Histories   Past Medical History:    Resolved  *[email or web address]@[facility] - Post-operative ileus: Onset on  4/22/2014 at 48 years.  Resolved on 4/27/2014 at 48 years.  Anxiety (300.00):  Resolved.   Family History:    Myelodysplastic syndrome  Mother  Diabetes mellitus  Grandmother (M)     Procedure history:    Appendectomy ([id number]) on 4/20/2014 at 48 Years.   Social History:             No active social history items have been recorded.      Physical Examination   Vital Signs   1/21/2017 9:48 AM CST Temperature Tympanic 96.7 DegF  LOW    Peripheral Pulse Rate 74 bpm    Pulse Site Radial artery    HR Method Manual    Systolic Blood Pressure 120 mmHg    Diastolic Blood Pressure 78 mmHg    Mean Arterial Pressure 92 mmHg    BP Site Right arm    BP Method Manual      Measurements from flowsheet : Measurements   1/21/2017 9:48 AM CST Height Measured - Standard 66.5 in    Weight Measured - Standard 158.4 lb    BSA 1.83 m2    Body Mass Index 25.18 kg/m2      Cardiovascular:       Arterial pulses: Right, Posterior tibial, 2+.    Musculoskeletal:  Normal range of motion, Normal strength, Tender in right MCL region. Negative Alvaro. Drawer sign is negative..    Integumentary:  No rash.    Neurologic:  No focal deficits.       Review / Management   Radiology results   Appears normal to my read, waiting for official read.  Will contact patient with any other findings.      Impression and Plan   Diagnosis     Knee MCL sprain ([id number] S83.411A).     Patient Instructions:       Counseled: Patient, Regarding diagnosis, Regarding medications, Activity, Verbalized understanding.    Knee immobilizer. Ice. Elevate. Aleve. Crutch walk. RTC in one week and PRN.

## (undated) DEVICE — EYE PACK 23GA CONSTELLATION PPK4254-03

## (undated) DEVICE — BLADE KNIFE BEAVER MINI BEAVER6400

## (undated) DEVICE — BLADE KNIFE BEAVER 60DEG BEAVER6600

## (undated) DEVICE — PACK VITRECTOMY PQ15VI57E

## (undated) DEVICE — SYR 05ML SLIP TIP W/O NDL 309647

## (undated) DEVICE — SU VICRYL 8-0 TG160-8 18" J547G

## (undated) DEVICE — DRAPE MICROSCOPE DRAPE  EYE DI40001

## (undated) DEVICE — EYE GAS C3F8 PER USE/CC/ML  8065797101

## (undated) DEVICE — EYE NDL RETROBULBAR 25GA 1.5" 581275

## (undated) DEVICE — EYE PROBE LASER 23GA FLEX RFID 8065751113

## (undated) DEVICE — EYE DRSG PAD OVAL

## (undated) DEVICE — EYE SHIELD PLASTIC

## (undated) DEVICE — SU SILK 2-0 TIE 18' A185H

## (undated) DEVICE — GLOVE PROTEXIS MICRO 7.0  2D73PM70

## (undated) DEVICE — EYE KIT AUTO GAS FOR CONSTELLATION 8065751014

## (undated) DEVICE — SU ETHILON 5-0 RD-1DA 18" 749G

## (undated) DEVICE — SU PLAIN 6-0 G-1DA 18" 770G

## (undated) DEVICE — EYE SOL BSS 500ML

## (undated) DEVICE — LINEN TOWEL PACK X5 5464

## (undated) DEVICE — NDL 18GA 1.5" 305196

## (undated) DEVICE — SU VICRYL 7-0 TG140-8DA 18" J546G

## (undated) DEVICE — EYE BACK FLUSH SOFT TIP 23GA VITREORETINAL 337.86

## (undated) RX ORDER — CEFAZOLIN SODIUM 500 MG/2.2ML
INJECTION, POWDER, FOR SOLUTION INTRAMUSCULAR; INTRAVENOUS
Status: DISPENSED
Start: 2017-09-06

## (undated) RX ORDER — DEXAMETHASONE SODIUM PHOSPHATE 10 MG/ML
INJECTION, SOLUTION INTRAMUSCULAR; INTRAVENOUS
Status: DISPENSED
Start: 2017-09-06

## (undated) RX ORDER — LIDOCAINE HYDROCHLORIDE 20 MG/ML
INJECTION, SOLUTION EPIDURAL; INFILTRATION; INTRACAUDAL; PERINEURAL
Status: DISPENSED
Start: 2017-09-06

## (undated) RX ORDER — FENTANYL CITRATE 50 UG/ML
INJECTION, SOLUTION INTRAMUSCULAR; INTRAVENOUS
Status: DISPENSED
Start: 2017-09-06

## (undated) RX ORDER — WATER 10 ML/10ML
INJECTION INTRAMUSCULAR; INTRAVENOUS; SUBCUTANEOUS
Status: DISPENSED
Start: 2017-09-06

## (undated) RX ORDER — ONDANSETRON 2 MG/ML
INJECTION INTRAMUSCULAR; INTRAVENOUS
Status: DISPENSED
Start: 2017-09-06

## (undated) RX ORDER — ATROPINE SULFATE 10 MG/ML
SOLUTION/ DROPS OPHTHALMIC
Status: DISPENSED
Start: 2017-09-06